# Patient Record
Sex: FEMALE | Race: WHITE | NOT HISPANIC OR LATINO | Employment: UNEMPLOYED | URBAN - METROPOLITAN AREA
[De-identification: names, ages, dates, MRNs, and addresses within clinical notes are randomized per-mention and may not be internally consistent; named-entity substitution may affect disease eponyms.]

---

## 2022-09-22 ENCOUNTER — OFFICE VISIT (OUTPATIENT)
Dept: FAMILY MEDICINE CLINIC | Facility: CLINIC | Age: 47
End: 2022-09-22

## 2022-09-22 VITALS
TEMPERATURE: 99.3 F | DIASTOLIC BLOOD PRESSURE: 80 MMHG | HEART RATE: 97 BPM | WEIGHT: 187 LBS | OXYGEN SATURATION: 100 % | SYSTOLIC BLOOD PRESSURE: 128 MMHG | HEIGHT: 64 IN | BODY MASS INDEX: 31.92 KG/M2

## 2022-09-22 DIAGNOSIS — Z71.6 ENCOUNTER FOR SMOKING CESSATION COUNSELING: ICD-10-CM

## 2022-09-22 DIAGNOSIS — M54.41 CHRONIC LOW BACK PAIN WITH RIGHT-SIDED SCIATICA, UNSPECIFIED BACK PAIN LATERALITY: ICD-10-CM

## 2022-09-22 DIAGNOSIS — G89.29 CHRONIC LOW BACK PAIN WITH RIGHT-SIDED SCIATICA, UNSPECIFIED BACK PAIN LATERALITY: ICD-10-CM

## 2022-09-22 DIAGNOSIS — Z51.81 THERAPEUTIC DRUG MONITORING: ICD-10-CM

## 2022-09-22 DIAGNOSIS — R06.02 SHORTNESS OF BREATH: ICD-10-CM

## 2022-09-22 DIAGNOSIS — F11.20 METHADONE MAINTENANCE THERAPY PATIENT: Primary | ICD-10-CM

## 2022-09-22 DIAGNOSIS — F33.1 MODERATE EPISODE OF RECURRENT MAJOR DEPRESSIVE DISORDER: ICD-10-CM

## 2022-09-22 DIAGNOSIS — R51.9 NONINTRACTABLE EPISODIC HEADACHE, UNSPECIFIED HEADACHE TYPE: ICD-10-CM

## 2022-09-22 PROCEDURE — 99204 OFFICE O/P NEW MOD 45 MIN: CPT | Performed by: FAMILY MEDICINE

## 2022-09-22 RX ORDER — FLUTICASONE PROPIONATE AND SALMETEROL 250; 50 UG/1; UG/1
POWDER RESPIRATORY (INHALATION)
COMMUNITY

## 2022-09-22 RX ORDER — FLUTICASONE PROPIONATE AND SALMETEROL 250; 50 UG/1; UG/1
POWDER RESPIRATORY (INHALATION)
COMMUNITY
Start: 2022-09-21

## 2022-09-22 RX ORDER — TIZANIDINE 2 MG/1
2 TABLET ORAL EVERY 8 HOURS PRN
Qty: 30 TABLET | Refills: 0 | Status: SHIPPED | OUTPATIENT
Start: 2022-09-22 | End: 2022-12-14 | Stop reason: SDUPTHER

## 2022-09-22 RX ORDER — ALBUTEROL SULFATE 90 UG/1
AEROSOL, METERED RESPIRATORY (INHALATION)
COMMUNITY

## 2022-09-22 RX ORDER — GABAPENTIN 800 MG/1
800 TABLET ORAL 3 TIMES DAILY
Qty: 90 TABLET | Refills: 2 | Status: SHIPPED | OUTPATIENT
Start: 2022-09-22

## 2022-09-22 RX ORDER — CLOBETASOL PROPIONATE 0.5 MG/G
OINTMENT TOPICAL
COMMUNITY

## 2022-09-22 RX ORDER — METHADONE HYDROCHLORIDE 10 MG/1
TABLET ORAL
COMMUNITY
Start: 2007-07-22

## 2022-09-22 RX ORDER — GABAPENTIN 800 MG/1
TABLET ORAL
COMMUNITY
End: 2022-09-22

## 2022-09-22 RX ORDER — BUPROPION HYDROCHLORIDE 150 MG/1
TABLET ORAL
COMMUNITY

## 2022-09-22 NOTE — PROGRESS NOTES
New Patient Office Visit      Patient Name: Keila Ambrose  : 1975   MRN: [id number]     Chief Complaint:    Chief Complaint   Patient presents with   • Arthritis   • Headache   • Breathing Problem       History of Present Illness: Keila Ambrose is a 46 y.o. female who is here today to establish care.  Chronic pain treated with methadone at her previous clinic.  Patient says she was told that she was no longer able to follow-up with her doctor because of insurance issues.  Patient would like to resume methadone.  Patient is not on methadone for addiction.  Patient is on it for chronic pain.  Patient reports having chronic lower back pain from pinched nerve.  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  She did not have insurance at the time so was not able to address issue and caused long-lasting pain.  Patient is on gabapentin which does help with the pain.  Patient reports having imaging and has been to pain clinic with injections and ablations in the past.  Patient has paperwork with her today regarding her methadone prescriptions and previous visit with Dr. Diop.  Patient usual [place] clinic.  Patient needs a new doctor who will prescribe methadone or pain management for her back pain.    Patient has depression is uncontrolled.  Patient started Wellbutrin around 1 week ago.  She thought it was causing headaches and stopped it.  Patient restarted this recently and has not noticed an improvement.    Patient wants to stop smoking.  We discussed smoking cessation and she was requesting Chantix today.    Patient has neck pain that is leading to her headache.  Patient says it starts in her upper back and goes up to her neck and head.    She reports shortness of breath.  She smokes cigarettes.  She is on a couple inhalers and denies having pulmonary function test.    Wants to stop smoking.    Review of systems was positive for shortness of breath and  depression    Physical exam: Patient respiratory status nonlabored.  Patient's mood and affect was appropriate.  Heart exam RRR.            Subjective          Past Medical History: History reviewed. No pertinent past medical history.    Past Surgical History: History reviewed. No pertinent surgical history.    Family History:   Family History   Problem Relation Age of Onset   • Hypertension Mother    • Hyperlipidemia Mother    • Other Mother    • Mental illness Mother    • Diabetes Father    • Other Father    • Liver disease Father    • Cancer Father    • Arthritis Father        Social History:   Social History     Socioeconomic History   • Marital status:    Tobacco Use   • Smoking status: Current Every Day Smoker     Packs/day: 1.00     Years: 32.00     Pack years: 32.00     Types: Cigarettes   • Smokeless tobacco: Never Used   Substance and Sexual Activity   • Alcohol use: Never   • Drug use: Never   • Sexual activity: Yes     Partners: Male       Medications:     Current Outpatient Medications:   •  albuterol sulfate  (90 Base) MCG/ACT inhaler, albuterol sulfate HFA 90 mcg/actuation aerosol inhaler, Disp: , Rfl:   •  buPROPion XL (WELLBUTRIN XL) 150 MG 24 hr tablet, bupropion HCl  mg 24 hr tablet, extended release  Take 1 tablet every day by oral route., Disp: , Rfl:   •  clobetasol (TEMOVATE) 0.05 % ointment, clobetasol 0.05 % topical ointment  APPLY A THIN LAYER TO THE AFFECTED AREA(S) BY TOPICAL ROUTE 2 TIMES PER DAY, Disp: , Rfl:   •  Fluticasone-Salmeterol (ADVAIR/WIXELA) 250-50 MCG/ACT DISKUS, Advair Diskus 250 mcg-50 mcg/dose powder for inhalation  Inhale 1 puff twice a day by inhalation route., Disp: , Rfl:   •  Fluticasone-Salmeterol (ADVAIR/WIXELA) 250-50 MCG/ACT DISKUS, , Disp: , Rfl:   •  methadone (DOLOPHINE) 10 MG tablet, , Disp: , Rfl:   •  gabapentin (Neurontin) 800 MG tablet, Take 1 tablet by mouth 3 (Three) Times a Day., Disp: 90 tablet, Rfl: 2  •  tiZANidine (ZANAFLEX) 2  "MG tablet, Take 1 tablet by mouth Every 8 (Eight) Hours As Needed for Muscle Spasms., Disp: 30 tablet, Rfl: 0    Allergies:   No Known Allergies    Objective     Physical Exam: Please see above  Vital Signs:   Vitals:    09/22/22 1439   BP: 128/80   Pulse: 97   Temp: 99.3 °F (37.4 °C)   SpO2: 100%   Weight: 84.8 kg (187 lb)   Height: 162.6 cm (64\")     Body mass index is 32.1 kg/m².       Assessment / Plan      Assessment/Plan:   Diagnoses and all orders for this visit:    1. Methadone maintenance therapy patient (HCC) (Primary)  -     Ambulatory Referral to Chemical Dependency    2. Therapeutic drug monitoring  -     Compliance Drug Analysis, Ur - Urine, Clean Catch    3. Chronic low back pain with right-sided sciatica, unspecified back pain laterality  -     Ambulatory Referral to Chemical Dependency  -     gabapentin (Neurontin) 800 MG tablet; Take 1 tablet by mouth 3 (Three) Times a Day.  Dispense: 90 tablet; Refill: 2    4. Nonintractable episodic headache, unspecified headache type  -     tiZANidine (ZANAFLEX) 2 MG tablet; Take 1 tablet by mouth Every 8 (Eight) Hours As Needed for Muscle Spasms.  Dispense: 30 tablet; Refill: 0    5. Shortness of breath  -     Full Pulmonary Function Test With Bronchodilator; Future    6. Moderate episode of recurrent major depressive disorder ([facility])    7. Encounter for smoking cessation counseling         1. Discussed methadone treatment for pain and at this time I do not think pain is treated with methadone.  We will have to wean patient off or send her to pain management for further management of her chronic pain.  We will have any records at this point but will request them from [facility].  I will continue gabapentin until further records have been reviewed.  2. We will try triazanylene for neck pain is causing headache.  Symptoms are consistent with tension headache.  Recommend chiropractic intervention or PT.  Patient can call for PT consult  3. Shortness of breath " concerning for COPD given smoking history.  We will get PFTs today.  Continue current treatment until then  4. Discussed her contract and controlled substances.  For low back pain we will continue gabapentin.  Filled out drug contract today and received UDS.  Eligio reviewed.      Will address patient's depression further at next visit.  She was advised to continue with Wellbutrin at this time for smoking cessation and depression.    Will consider nicotine patch and gum at next visit.      Follow Up:   Return in about 2 months (around 11/22/2022) for Recheck.    Chase Durán DO  [facility] [place]

## 2022-09-23 ENCOUNTER — TELEPHONE (OUTPATIENT)
Dept: FAMILY MEDICINE CLINIC | Facility: CLINIC | Age: 47
End: 2022-09-23

## 2022-09-23 NOTE — TELEPHONE ENCOUNTER
Spoke with pt, informed her that I have contacted 5 different pain medicine clinics, and they do no prescribe methadone for pain. Informed her that I also spoke with [facility] today, and they are willing to review her referral and possibly taper her off methadone. Pt states that she is interested in this option. Referral has been faxed to their office at [phone]

## 2022-09-23 NOTE — TELEPHONE ENCOUNTER
Caller: Keila Ambrose    Relationship: Self    Best call back number: [phone]    What was the call regarding:   PATIENT WOULD LIKE A CALL BACK REGARDING THE STATUS OF HER REFERRAL TO PAIN MANAGEMENT SINCE SHE IS ALMOST OUT OF HER PAIN MEDICATION AT THIS TIME     Do you require a callback: YES

## 2022-09-29 LAB — DRUGS UR: NORMAL

## 2022-12-14 DIAGNOSIS — R51.9 NONINTRACTABLE EPISODIC HEADACHE, UNSPECIFIED HEADACHE TYPE: ICD-10-CM

## 2022-12-14 NOTE — TELEPHONE ENCOUNTER
Rx Refill Note  Requested Prescriptions     Pending Prescriptions Disp Refills   • tiZANidine (ZANAFLEX) 2 MG tablet 30 tablet 0     Sig: Take 1 tablet by mouth Every 8 (Eight) Hours As Needed for Muscle Spasms.      Last office visit with prescribing clinician: 9/22/2022   Last telemedicine visit with prescribing clinician: Visit date not found   Next office visit with prescribing clinician: Visit date not found                         Would you like a call back once the refill request has been completed: [] Yes [] No    If the office needs to give you a call back, can they leave a voicemail: [] Yes [] No    Maria Ines Mccurdy MA  12/14/22, 16:02 EST

## 2022-12-15 RX ORDER — TIZANIDINE 2 MG/1
2 TABLET ORAL EVERY 8 HOURS PRN
Qty: 30 TABLET | Refills: 0 | Status: SHIPPED | OUTPATIENT
Start: 2022-12-15

## 2023-04-24 DIAGNOSIS — R51.9 NONINTRACTABLE EPISODIC HEADACHE, UNSPECIFIED HEADACHE TYPE: ICD-10-CM

## 2023-04-24 RX ORDER — TIZANIDINE 2 MG/1
2 TABLET ORAL EVERY 8 HOURS PRN
Qty: 30 TABLET | Refills: 0 | Status: SHIPPED | OUTPATIENT
Start: 2023-04-24

## 2023-04-24 NOTE — TELEPHONE ENCOUNTER
Rx Refill Note  Requested Prescriptions     Pending Prescriptions Disp Refills   • tiZANidine (ZANAFLEX) 2 MG tablet 30 tablet 0     Sig: Take 1 tablet by mouth Every 8 (Eight) Hours As Needed for Muscle Spasms.      Last office visit with prescribing clinician: 9/22/2022   Last telemedicine visit with prescribing clinician: Visit date not found   Next office visit with prescribing clinician: Visit date not found                         Would you like a call back once the refill request has been completed: [] Yes [] No    If the office needs to give you a call back, can they leave a voicemail: [] Yes [] No    Maria Ines Mccurdy MA  04/24/23, 13:57 EDT